# Patient Record
Sex: MALE | Race: WHITE | NOT HISPANIC OR LATINO | Employment: OTHER | URBAN - METROPOLITAN AREA
[De-identification: names, ages, dates, MRNs, and addresses within clinical notes are randomized per-mention and may not be internally consistent; named-entity substitution may affect disease eponyms.]

---

## 2024-09-09 ENCOUNTER — TRANSFERRED RECORDS (OUTPATIENT)
Dept: HEALTH INFORMATION MANAGEMENT | Facility: CLINIC | Age: 69
End: 2024-09-09

## 2024-09-09 ENCOUNTER — MEDICAL CORRESPONDENCE (OUTPATIENT)
Dept: HEALTH INFORMATION MANAGEMENT | Facility: CLINIC | Age: 69
End: 2024-09-09

## 2024-09-23 ENCOUNTER — TRANSFERRED RECORDS (OUTPATIENT)
Dept: HEALTH INFORMATION MANAGEMENT | Facility: CLINIC | Age: 69
End: 2024-09-23
Payer: MEDICARE

## 2024-09-23 LAB
ALT SERPL-CCNC: 42 U/L
AST SERPL-CCNC: 35 U/L (ref 17–59)
CREATININE (EXTERNAL): 0.8 MG/DL (ref 0.7–1.4)
GLUCOSE (EXTERNAL): 114 MG/DL (ref 60–99)
INR (EXTERNAL): 1.02 (ref 0.9–1.11)
POTASSIUM (EXTERNAL): 4.7 MMOL/L (ref 3.5–5.1)

## 2024-09-30 ENCOUNTER — OFFICE VISIT (OUTPATIENT)
Dept: CARDIOLOGY | Facility: CLINIC | Age: 69
End: 2024-09-30
Payer: MEDICARE

## 2024-09-30 VITALS
DIASTOLIC BLOOD PRESSURE: 68 MMHG | HEIGHT: 68 IN | SYSTOLIC BLOOD PRESSURE: 126 MMHG | HEART RATE: 80 BPM | RESPIRATION RATE: 16 BRPM | BODY MASS INDEX: 31.28 KG/M2 | WEIGHT: 206.4 LBS

## 2024-09-30 DIAGNOSIS — E78.2 MIXED HYPERLIPIDEMIA: ICD-10-CM

## 2024-09-30 DIAGNOSIS — I10 BENIGN ESSENTIAL HYPERTENSION: ICD-10-CM

## 2024-09-30 DIAGNOSIS — I51.9 LEFT VENTRICULAR DYSFUNCTION: Primary | ICD-10-CM

## 2024-09-30 DIAGNOSIS — I25.10 CORONARY ARTERY DISEASE INVOLVING NATIVE CORONARY ARTERY OF NATIVE HEART WITHOUT ANGINA PECTORIS: ICD-10-CM

## 2024-09-30 PROCEDURE — 99204 OFFICE O/P NEW MOD 45 MIN: CPT | Performed by: INTERNAL MEDICINE

## 2024-09-30 PROCEDURE — G2211 COMPLEX E/M VISIT ADD ON: HCPCS | Performed by: INTERNAL MEDICINE

## 2024-09-30 RX ORDER — ROSUVASTATIN CALCIUM 20 MG/1
20 TABLET, COATED ORAL DAILY
COMMUNITY

## 2024-09-30 RX ORDER — IBUPROFEN 200 MG
200 TABLET ORAL EVERY 4 HOURS PRN
COMMUNITY

## 2024-09-30 RX ORDER — AMLODIPINE AND BENAZEPRIL HYDROCHLORIDE 5; 10 MG/1; MG/1
1 CAPSULE ORAL DAILY
COMMUNITY

## 2024-09-30 RX ORDER — TIZANIDINE HYDROCHLORIDE 4 MG/1
8 CAPSULE, GELATIN COATED ORAL PRN
COMMUNITY

## 2024-09-30 NOTE — PATIENT INSTRUCTIONS
It was a pleasure to meet with you today.      Below is a summary of your visit.   Continue your medications without changes.  Someone will call you to schedule an echocardiogram to assess your heart function.   You should be able to have your ankle surgery as planned.  Follow up with me in June, 2025.    We will call you to inform you of your test or procedure results within 3 business days of the test being performed.  If you do not hear from our office with the test results within 1 week please do not hesitate to call asking for these results.     Please do not hesitate to call the [facility] with any questions or concerns at [phone]. You can also reach my nurse, Sarahi, at [phone].    Sincerely,

## 2024-09-30 NOTE — PROGRESS NOTES
[facility]   [address]  [place] [address]   [email or web address]   OFFICE: [phone]     CARDIOLOGY CLINIC NOTE     Thank you, Siddhartha Diaz, for asking the [facility] Heart Care team to see Mr. Shelton Eaton to evaluate Consult (Consult for high coronary calcium score )         Assessment/Recommendations   Assessment:    Coronary artery disease - with a markedly elevated calcium score and a recent low-risk stress test. No current angina.  Hypertension - recently started on antihypertensive medication. BP okay here, but had a hypertensive response to exercise. As he has only recently started his medication I won't adjust his medications today, but would consider increasing his Lotrel for better BP control.  Mixed hyperlipidemia - on appropriate statin dose.  LV dysfunction with an LVEF of 48% by NM stress. Will want to confirm the LVEF by echo.    Plan:  Continue medications without changes  Consider increasing Lotrel  Consider adding aspirin 81 mg   Echocardiogram to assess LV function, given report of reduced LVEF by NM stress.  Okay to proceed with ankle surgery as planned.  Follow up in June, 2025         History of Present Illness   Mr. Shelton Eaton is a 69 year old male with a significant past history of HTN, HLD who presents for evaluation of an elevated cardiac calcium score.     recently had CAD screening with a calcium score that was markedly elevated. He then had an exercise NM stress test that was negative for ischemia with good exercise capacity. He did have a hypertensive response to exercise. He generally feels well and denies anginal symptoms.      Other than noted above, Mr. Eaton denies any chest pain/pressure/tightness, shortness of breath at rest or with exertion, light headedness/dizziness, pre-syncope, syncope, lower extremity swelling, palpitations, paroxysmal nocturnal dyspnea (PND), or orthopnea.     Cardiac  Problems and Cardiac Diagnostics     Most Recent Cardiac testing:  ECG dated 9/23/24 (personaly reviewed and interpreted): normal sinus rhythm    Exercise NM stress 9/30/2024  Summary    1. The patient exercised for  8 minute(s) on the  Adi protocol and achieved 95 % of maximum predicted HR.  Study was diagnostic.  Excellent functional capacity for age.     2. The patient experienced no symptoms during the stress test.     3. Hypertensive response to stress.     4. Mildly reduced LV function, EF 48%.     5. Negative stress ECG for ST segment depression.     6. Normal perfusion imaging.  There is no evidence for ischemia or infarction identified on this study.     Prior Study Comparison     No prior study for comparison.     CT cardiac calcium score 9/9/24  Total: 1181.6  LM: 7.6  RCA: 168.0  LAD: 896.7  LCX: 109.3    Stress test: dated 8/17/22 revealed   1. Based on Lemus Score of 3 the patient is at Moderate risk for cardiovascular events.     2. The patient exercised for 8 minutes and 55 seconds on the  Adi protocol.    3. Some PVCs Noted during stress.     4. Hypertensive response to stress.     5. Excellent functional capacity for age.     6. The patient experienced no symptoms during the stress test.     7. Negative stress ECG for diagnostic ST segment depression.     8. Normal Stress Echo test, no evidence for ischemia.          Medications  Allergies   Current Outpatient Medications   Medication Sig Dispense Refill    amLODIPine-benazepril (LOTREL) 5-10 MG capsule Take 1 capsule by mouth daily.      ibuprofen (ADVIL/MOTRIN) 200 MG tablet Take 200 mg by mouth every 4 hours as needed for pain.      rosuvastatin (CRESTOR) 20 MG tablet Take 20 mg by mouth daily.      tiZANidine (ZANAFLEX) 4 MG capsule Take 8 mg by mouth as needed for muscle spasms.        No Known Allergies     Physical Examination Review of Systems   Vitals: /68 (BP Location: Left arm, Patient Position: Sitting, Cuff Size: Adult  "Regular)   Pulse 80   Resp 16   Ht 1.727 m (5' 8\")   Wt 93.6 kg (206 lb 6.4 oz)   BMI 31.38 kg/m    BMI= Body mass index is 31.38 kg/m .  Wt Readings from Last 3 Encounters:   24 93.6 kg (206 lb 6.4 oz)       General: pleasant male. No acute distress.   Neck: No JVD  Lungs: clear to auscultation  COR: normal rate, regular rhythm, No murmurs, rubs, or gallops  Extrem: No edema        Please refer above for cardiac ROS details.       Past History     Past Medical History  No past medical history on file.    Past Surgical History  Past Surgical History:   Procedure Laterality Date    IR CHEST PORT PLACEMENT > 5 YRS OF AGE  10/10/2016       Family History: No family history on file.     Social History:   Social History     Socioeconomic History    Marital status:      Spouse name: Not on file    Number of children: Not on file    Years of education: Not on file    Highest education level: Not on file   Occupational History    Not on file   Tobacco Use    Smoking status: Former     Current packs/day: 0.00     Types: Cigarettes     Quit date:      Years since quittin.7    Smokeless tobacco: Never   Vaping Use    Vaping status: Never Used   Substance and Sexual Activity    Alcohol use: Not on file    Drug use: Never    Sexual activity: Not on file   Other Topics Concern    Not on file   Social History Narrative    Not on file     Social Determinants of Health     Financial Resource Strain: Not on file   Food Insecurity: Not on file   Transportation Needs: Not on file   Physical Activity: Not on file   Stress: Not on file   Social Connections: Not on file   Interpersonal Safety: Not on file   Housing Stability: Not on file            Lab Results    Chemistry/lipid CBC Cardiac Enzymes/BNP/TSH/INR   No results found for: \"CHOL\", \"HDL\", \"TRIG\", \"CHOLHDL\", \"CREATININE\", \"BUN\", \"NA\", \"CO2\" No results found for: \"WBC\", \"HGB\", \"HCT\", \"MCV\", \"PLT\" No results found for: \"CKTOTAL\", \"CKMB\", \"TROPONINI\", " "\"BNP\", \"TSH\", \"INR\"         "

## 2024-09-30 NOTE — LETTER
9/30/2024    Siddhartha Roth MD  [facility] [address] [place] [id number]    RE: Shelton Eaton       Dear Colleague,     I had the pleasure of seeing Shelton Eaton in the [facility].    [facility] HE[facility]ARE   [address]  [place] [address]   [email or web address]   OFFICE: [phone]     CARDIOLOGY CLINIC NOTE     Thank you, Dr. Roth, Siddhartha WRIGHT, for asking the [facility] Heart Care team to see Mr. Shelton Eaton to evaluate Consult (Consult for high coronary calcium score )         Assessment/Recommendations   Assessment:    Coronary artery disease - with a markedly elevated calcium score and a recent low-risk stress test. No current angina.  Hypertension - recently started on antihypertensive medication. BP okay here, but had a hypertensive response to exercise. As he has only recently started his medication I won't adjust his medications today, but would consider increasing his Lotrel for better BP control.  Mixed hyperlipidemia - on appropriate statin dose.  LV dysfunction with an LVEF of 48% by NM stress. Will want to confirm the LVEF by echo.    Plan:  Continue medications without changes  Consider increasing Lotrel  Consider adding aspirin 81 mg   Echocardiogram to assess LV function, given report of reduced LVEF by NM stress.  Okay to proceed with ankle surgery as planned.  Follow up in June, 2025         History of Present Illness   Mr. Shelton Eaton is a 69 year old male with a significant past history of HTN, HLD who presents for evaluation of an elevated cardiac calcium score.     recently had CAD screening with a calcium score that was markedly elevated. He then had an exercise NM stress test that was negative for ischemia with good exercise capacity. He did have a hypertensive response to exercise. He generally feels well and denies anginal symptoms.      Other than noted above, Mr. Eaton denies any chest  pain/pressure/tightness, shortness of breath at rest or with exertion, light headedness/dizziness, pre-syncope, syncope, lower extremity swelling, palpitations, paroxysmal nocturnal dyspnea (PND), or orthopnea.     Cardiac Problems and Cardiac Diagnostics     Most Recent Cardiac testing:  ECG dated 9/23/24 (personaly reviewed and interpreted): normal sinus rhythm    Exercise NM stress 9/30/2024  Summary    1. The patient exercised for  8 minute(s) on the  Adi protocol and achieved 95 % of maximum predicted HR.  Study was diagnostic.  Excellent functional capacity for age.     2. The patient experienced no symptoms during the stress test.     3. Hypertensive response to stress.     4. Mildly reduced LV function, EF 48%.     5. Negative stress ECG for ST segment depression.     6. Normal perfusion imaging.  There is no evidence for ischemia or infarction identified on this study.     Prior Study Comparison     No prior study for comparison.     CT cardiac calcium score 9/9/24  Total: 1181.6  LM: 7.6  RCA: 168.0  LAD: 896.7  LCX: 109.3    Stress test: dated 8/17/22 revealed   1. Based on Lemus Score of 3 the patient is at Moderate risk for cardiovascular events.     2. The patient exercised for 8 minutes and 55 seconds on the  Adi protocol.    3. Some PVCs Noted during stress.     4. Hypertensive response to stress.     5. Excellent functional capacity for age.     6. The patient experienced no symptoms during the stress test.     7. Negative stress ECG for diagnostic ST segment depression.     8. Normal Stress Echo test, no evidence for ischemia.          Medications  Allergies   Current Outpatient Medications   Medication Sig Dispense Refill     amLODIPine-benazepril (LOTREL) 5-10 MG capsule Take 1 capsule by mouth daily.       ibuprofen (ADVIL/MOTRIN) 200 MG tablet Take 200 mg by mouth every 4 hours as needed for pain.       rosuvastatin (CRESTOR) 20 MG tablet Take 20 mg by mouth daily.       tiZANidine  "(ZANAFLEX) 4 MG capsule Take 8 mg by mouth as needed for muscle spasms.        No Known Allergies     Physical Examination Review of Systems   Vitals: /68 (BP Location: Left arm, Patient Position: Sitting, Cuff Size: Adult Regular)   Pulse 80   Resp 16   Ht 1.727 m (5' 8\")   Wt 93.6 kg (206 lb 6.4 oz)   BMI 31.38 kg/m    BMI= Body mass index is 31.38 kg/m .  Wt Readings from Last 3 Encounters:   24 93.6 kg (206 lb 6.4 oz)       General: pleasant male. No acute distress.   Neck: No JVD  Lungs: clear to auscultation  COR: normal rate, regular rhythm, No murmurs, rubs, or gallops  Extrem: No edema        Please refer above for cardiac ROS details.       Past History     Past Medical History  No past medical history on file.    Past Surgical History  Past Surgical History:   Procedure Laterality Date     IR CHEST PORT PLACEMENT > 5 YRS OF AGE  10/10/2016       Family History: No family history on file.     Social History:   Social History     Socioeconomic History     Marital status:      Spouse name: Not on file     Number of children: Not on file     Years of education: Not on file     Highest education level: Not on file   Occupational History     Not on file   Tobacco Use     Smoking status: Former     Current packs/day: 0.00     Types: Cigarettes     Quit date:      Years since quittin.     Smokeless tobacco: Never   Vaping Use     Vaping status: Never Used   Substance and Sexual Activity     Alcohol use: Not on file     Drug use: Never     Sexual activity: Not on file   Other Topics Concern     Not on file   Social History Narrative     Not on file     Social Determinants of Health     Financial Resource Strain: Not on file   Food Insecurity: Not on file   Transportation Needs: Not on file   Physical Activity: Not on file   Stress: Not on file   Social Connections: Not on file   Interpersonal Safety: Not on file   Housing Stability: Not on file            Lab Results  " "  Chemistry/lipid CBC Cardiac Enzymes/BNP/TSH/INR   No results found for: \"CHOL\", \"HDL\", \"TRIG\", \"CHOLHDL\", \"CREATININE\", \"BUN\", \"NA\", \"CO2\" No results found for: \"WBC\", \"HGB\", \"HCT\", \"MCV\", \"PLT\" No results found for: \"CKTOTAL\", \"CKMB\", \"TROPONINI\", \"BNP\", \"TSH\", \"INR\"             Thank you for allowing me to participate in the care of your patient.      Sincerely,     Shawn Painter MD     [facility]  cc:   Siddhartha Roth MD  [facility]  [address] [place], [place] [id number]      "

## 2024-12-15 ENCOUNTER — HEALTH MAINTENANCE LETTER (OUTPATIENT)
Age: 69
End: 2024-12-15

## 2025-05-28 ENCOUNTER — TRANSFERRED RECORDS (OUTPATIENT)
Dept: HEALTH INFORMATION MANAGEMENT | Facility: CLINIC | Age: 70
End: 2025-05-28

## 2025-05-28 LAB
ALT SERPL-CCNC: 40 U/L
AST SERPL-CCNC: 36 U/L (ref 17–59)
CHOLESTEROL (EXTERNAL): 128 MG/DL (ref 0–200)
CREATININE (EXTERNAL): 0.8 MG/DL (ref 0.7–1.4)
GLUCOSE (EXTERNAL): 109 MG/DL (ref 60–99)
HDLC SERPL-MCNC: 63 MG/DL (ref 35–65)
LDL CHOLESTEROL CALCULATED (EXTERNAL): 53 MG/DL (ref 0–130)
POTASSIUM (EXTERNAL): 4.3 MMOL/L (ref 3.5–5.1)
TRIGLYCERIDES (EXTERNAL): 61 MG/DL (ref 0–200)

## 2025-07-03 ENCOUNTER — OFFICE VISIT (OUTPATIENT)
Dept: CARDIOLOGY | Facility: CLINIC | Age: 70
End: 2025-07-03
Attending: INTERNAL MEDICINE
Payer: MEDICARE

## 2025-07-03 VITALS
DIASTOLIC BLOOD PRESSURE: 73 MMHG | SYSTOLIC BLOOD PRESSURE: 120 MMHG | RESPIRATION RATE: 16 BRPM | BODY MASS INDEX: 29.8 KG/M2 | HEART RATE: 72 BPM | WEIGHT: 196 LBS

## 2025-07-03 DIAGNOSIS — I10 BENIGN ESSENTIAL HYPERTENSION: ICD-10-CM

## 2025-07-03 DIAGNOSIS — E78.2 MIXED HYPERLIPIDEMIA: ICD-10-CM

## 2025-07-03 DIAGNOSIS — Z87.891 PERSONAL HISTORY OF TOBACCO USE, PRESENTING HAZARDS TO HEALTH: ICD-10-CM

## 2025-07-03 DIAGNOSIS — I25.10 CORONARY ARTERY DISEASE INVOLVING NATIVE CORONARY ARTERY OF NATIVE HEART WITHOUT ANGINA PECTORIS: Primary | ICD-10-CM

## 2025-07-03 DIAGNOSIS — I70.0 CALCIFICATION OF AORTA: ICD-10-CM

## 2025-07-03 DIAGNOSIS — I51.9 LEFT VENTRICULAR DYSFUNCTION: ICD-10-CM

## 2025-07-03 RX ORDER — LISINOPRIL 10 MG/1
12.5 TABLET ORAL DAILY
COMMUNITY

## 2025-07-03 NOTE — PATIENT INSTRUCTIONS
"It was a pleasure to meet with you today.      Below is a summary of your visit.   Continue your medications without changes.  I encourage regular exercise; more is better.   Your coronary artery disease is stable, and no further routine cardiology follow-up is needed at this time. Your primary care provider (PCP) will continue to monitor your heart health and risk factors, including cholesterol, blood pressure, and lifestyle modifications. If you develop new symptoms, such as chest discomfort or worsening shortness of breath your PCP can always reach out to our cardiology team for assistance and arrange a follow-up if needed.  Consider arranging for another stress test in 3-5 years through Dr. Roth.    Consider reading or listening to the book, \"Outlive: the Science and Art of Longevity\" by Dr. Jimi Harding.       Please do not hesitate to call the [facility] with any questions or concerns at [phone]. You can also reach my nurse, Sarahi, at [phone].    Sincerely,       "

## 2025-07-03 NOTE — PROGRESS NOTES
[facility] HEART CARE   [address]  [place] [address]   [email or web address]   OFFICE: [phone]     CARDIOLOGY CLINIC NOTE     Thank you, Siddhartha Diaz, for asking the [facility] Heart Care team to see Mr. Shelton Eaton to evaluate No chief complaint on file.         Assessment/Recommendations   Assessment:    Coronary artery disease - with a markedly elevated calcium score and a recent low-risk stress test. No current angina.   Hypertension - well-controlled with LDL of 53 as of 5/28/25  Mixed hyperlipidemia - on appropriate statin dose.  Aortic calcifications - on recent chest x-ray. Aorta has previously been screened by CT imaging and no aneurysm was identified.    Reason for Graduation: Stable Coronary Artery Disease  The patient has demonstrated stable coronary artery disease with well-controlled symptoms, risk factors, and medical management. They no longer require ongoing cardiology follow-up and will transition to primary care for continued cardiovascular risk factor management and routine monitoring.    Current Status & Assessment  Current Cardiac Medications: Stable regimen  Lipid Panel:  Recent Labs   Lab Test 05/28/25  1114   TRIG 61      Symptoms: No recent angina, dyspnea, syncope, or arrhythmias  Graduation Criteria Met  ? Stable coronary artery disease  without recurrent symptoms for >= 12 months  ? No recent ACS, revascularization, or high-risk ischemic changes in past 2 years    Next Steps  Follow-up Plan:  1. Routine cardiovascular risk factor management to continue with Primary Care Provider (PCP): Recommend stress testing every 3-5 years or with symptoms suspicious for angina.  2. Reassessment in 6-12 months with PCP unless new concerns arise  3.  Continuation of antiplatelet, statin, and antihypertensive therapy   4.  Encourage lifestyle modifications (diet, exercise, smoking cessation)    When to Refer Back to Cardiology:  1. New or  worsening anginal symptoms  2. New ischemic findings on cardiac testing (ECG, stress test, imaging)    Final Impression:  The patient is clinically stable with well-managed coronary artery disease and no current need for cardiology follow-up. The patient has been informed of their graduation plan and will continue care with their PCP.    Order Placed: Graduation Referral to Primary Care             History of Present Illness   Mr. Shelton Eaton is a 70 year old male with a significant past history of HTN, HLD who presents for follow-up.    Mr. Eaton has known coronary artery disease based on a high CT coronary calcium score of >1000. Exercise stress testing in 9/2024 was reassuring.     Shelton is feeling well today.  He remains active with golfing, walking, bike riding, and yard work.  He denies any exertional cardiorespiratory symptoms or limitations.  He is tolerating his medications which have been stable.    Other than noted above, Mr. Eaton denies any chest pain/pressure/tightness, shortness of breath at rest or with exertion, light headedness/dizziness, pre-syncope, syncope, lower extremity swelling, palpitations, paroxysmal nocturnal dyspnea (PND), or orthopnea.     Cardiac Problems and Cardiac Diagnostics     Most Recent Cardiac testing:  ECG dated 9/23/24 (personaly reviewed and interpreted): normal sinus rhythm    TTE 10/9/24  The left ventricle is normal in size.  There is borderline concentric left ventricular hypertrophy.  The visual ejection fraction is 50-55%.  No regional wall motion abnormalities noted.  Normal right ventricle size and systolic function.  No valvular disease.     No previous study for comparison.    Exercise NM stress 9/30/2024  Summary    1. The patient exercised for  8 minute(s) on the  Adi protocol and achieved 95 % of maximum predicted HR.  Study was diagnostic.  Excellent functional capacity for age.     2. The patient experienced no symptoms during the stress test.     3.  Hypertensive response to stress.     4. Mildly reduced LV function, EF 48%.     5. Negative stress ECG for ST segment depression.     6. Normal perfusion imaging.  There is no evidence for ischemia or infarction identified on this study.     Prior Study Comparison     No prior study for comparison.     CT cardiac calcium score 9/9/24  Total: 1181.6  LM: 7.6  RCA: 168.0  LAD: 896.7  LCX: 109.3    Stress test: dated 8/17/22 revealed   1. Based on Lemus Score of 3 the patient is at Moderate risk for cardiovascular events.     2. The patient exercised for 8 minutes and 55 seconds on the  Adi protocol.    3. Some PVCs Noted during stress.     4. Hypertensive response to stress.     5. Excellent functional capacity for age.     6. The patient experienced no symptoms during the stress test.     7. Negative stress ECG for diagnostic ST segment depression.     8. Normal Stress Echo test, no evidence for ischemia.          Medications  Allergies   Current Outpatient Medications   Medication Sig Dispense Refill    amLODIPine-benazepril (LOTREL) 5-10 MG capsule Take 1 capsule by mouth daily.      ibuprofen (ADVIL/MOTRIN) 200 MG tablet Take 200 mg by mouth every 4 hours as needed for pain.      rosuvastatin (CRESTOR) 20 MG tablet Take 20 mg by mouth daily.      tiZANidine (ZANAFLEX) 4 MG capsule Take 8 mg by mouth as needed for muscle spasms.        No Known Allergies     Physical Examination Review of Systems   Vitals: There were no vitals taken for this visit.  BMI= There is no height or weight on file to calculate BMI.  Wt Readings from Last 3 Encounters:   09/30/24 93.6 kg (206 lb 6.4 oz)       General: pleasant male. No acute distress.   Neck: No JVD  Lungs: clear to auscultation  COR: normal rate, regular rhythm, No murmurs, rubs, or gallops  Extrem: No edema        Please refer above for cardiac ROS details.       Past History     Past Medical History  No past medical history on file.    Past Surgical History  Past  "Surgical History:   Procedure Laterality Date    IR CHEST PORT PLACEMENT > 5 YRS OF AGE  10/10/2016       Family History: No family history on file.     Social History:   Social History     Socioeconomic History    Marital status:      Spouse name: Not on file    Number of children: Not on file    Years of education: Not on file    Highest education level: Not on file   Occupational History    Not on file   Tobacco Use    Smoking status: Former     Current packs/day: 0.00     Types: Cigarettes     Quit date:      Years since quittin.5    Smokeless tobacco: Never   Vaping Use    Vaping status: Never Used   Substance and Sexual Activity    Alcohol use: Not on file    Drug use: Never    Sexual activity: Not on file   Other Topics Concern    Not on file   Social History Narrative    Not on file     Social Drivers of Health     Financial Resource Strain: Not on file   Food Insecurity: Not on file   Transportation Needs: Not on file   Physical Activity: Not on file   Stress: Not on file   Social Connections: Not on file   Interpersonal Safety: Not on file   Housing Stability: Not on file            Lab Results    Chemistry/lipid CBC Cardiac Enzymes/BNP/TSH/INR   Lab Results   Component Value Date    TRIG 61 2025 at [facility]  Total chol: 128  HDL: 63  LDL: 53  Trigs: 61   No results found for: \"WBC\", \"HGB\", \"HCT\", \"MCV\", \"PLT\"         Lab Results   Component Value Date    INR 1.02 2024            "

## 2025-07-03 NOTE — LETTER
7/3/2025    Siddhartha Roth MD  [facility] [address] [place] [id number]    RE: Shelton BRITT Uma       Dear Colleague,     I had the pleasure of seeing Shelton Eaton in the [facility].    [facility]   [address]  [place] [address]   [email or web address]   OFFICE: [phone]     CARDIOLOGY CLINIC NOTE     Thank you, Dr. Roth, Siddhartha WRIGHT, for asking the [facility] team to see . Shelton Eaton to evaluate No chief complaint on file.         Assessment/Recommendations   Assessment:    Coronary artery disease - with a markedly elevated calcium score and a recent low-risk stress test. No current angina.   Hypertension - well-controlled with LDL of 53 as of 5/28/25  Mixed hyperlipidemia - on appropriate statin dose.  Aortic calcifications - on recent chest x-ray. Aorta has previously been screened by CT imaging and no aneurysm was identified.    Reason for Graduation: Stable Coronary Artery Disease  The patient has demonstrated stable coronary artery disease with well-controlled symptoms, risk factors, and medical management. They no longer require ongoing cardiology follow-up and will transition to primary care for continued cardiovascular risk factor management and routine monitoring.    Current Status & Assessment  Current Cardiac Medications: Stable regimen  Lipid Panel:  Recent Labs   Lab Test 05/28/25  1114   TRIG 61      Symptoms: No recent angina, dyspnea, syncope, or arrhythmias  Graduation Criteria Met  ? Stable coronary artery disease  without recurrent symptoms for >= 12 months  ? No recent ACS, revascularization, or high-risk ischemic changes in past 2 years    Next Steps  Follow-up Plan:  1. Routine cardiovascular risk factor management to continue with Primary Care Provider (PCP): Recommend stress testing every 3-5 years or with symptoms suspicious for angina.  2. Reassessment in 6-12 months with PCP unless  new concerns arise  3.  Continuation of antiplatelet, statin, and antihypertensive therapy   4.  Encourage lifestyle modifications (diet, exercise, smoking cessation)    When to Refer Back to Cardiology:  1. New or worsening anginal symptoms  2. New ischemic findings on cardiac testing (ECG, stress test, imaging)    Final Impression:  The patient is clinically stable with well-managed coronary artery disease and no current need for cardiology follow-up. The patient has been informed of their graduation plan and will continue care with their PCP.    Order Placed: Graduation Referral to Primary Care             History of Present Illness   Mr. Shelton Eaton is a 70 year old male with a significant past history of HTN, HLD who presents for follow-up.    Mr. Eaton has known coronary artery disease based on a high CT coronary calcium score of >1000. Exercise stress testing in 9/2024 was reassuring.     Shelton is feeling well today.  He remains active with golfing, walking, bike riding, and yard work.  He denies any exertional cardiorespiratory symptoms or limitations.  He is tolerating his medications which have been stable.    Other than noted above, Mr. Eaton denies any chest pain/pressure/tightness, shortness of breath at rest or with exertion, light headedness/dizziness, pre-syncope, syncope, lower extremity swelling, palpitations, paroxysmal nocturnal dyspnea (PND), or orthopnea.     Cardiac Problems and Cardiac Diagnostics     Most Recent Cardiac testing:  ECG dated 9/23/24 (personaly reviewed and interpreted): normal sinus rhythm    TTE 10/9/24  The left ventricle is normal in size.  There is borderline concentric left ventricular hypertrophy.  The visual ejection fraction is 50-55%.  No regional wall motion abnormalities noted.  Normal right ventricle size and systolic function.  No valvular disease.     No previous study for comparison.    Exercise NM stress 9/30/2024  Summary    1. The patient exercised for  8  minute(s) on the  Adi protocol and achieved 95 % of maximum predicted HR.  Study was diagnostic.  Excellent functional capacity for age.     2. The patient experienced no symptoms during the stress test.     3. Hypertensive response to stress.     4. Mildly reduced LV function, EF 48%.     5. Negative stress ECG for ST segment depression.     6. Normal perfusion imaging.  There is no evidence for ischemia or infarction identified on this study.     Prior Study Comparison     No prior study for comparison.     CT cardiac calcium score 9/9/24  Total: 1181.6  LM: 7.6  RCA: 168.0  LAD: 896.7  LCX: 109.3    Stress test: dated 8/17/22 revealed   1. Based on Lemus Score of 3 the patient is at Moderate risk for cardiovascular events.     2. The patient exercised for 8 minutes and 55 seconds on the  Adi protocol.    3. Some PVCs Noted during stress.     4. Hypertensive response to stress.     5. Excellent functional capacity for age.     6. The patient experienced no symptoms during the stress test.     7. Negative stress ECG for diagnostic ST segment depression.     8. Normal Stress Echo test, no evidence for ischemia.          Medications  Allergies   Current Outpatient Medications   Medication Sig Dispense Refill     amLODIPine-benazepril (LOTREL) 5-10 MG capsule Take 1 capsule by mouth daily.       ibuprofen (ADVIL/MOTRIN) 200 MG tablet Take 200 mg by mouth every 4 hours as needed for pain.       rosuvastatin (CRESTOR) 20 MG tablet Take 20 mg by mouth daily.       tiZANidine (ZANAFLEX) 4 MG capsule Take 8 mg by mouth as needed for muscle spasms.        No Known Allergies     Physical Examination Review of Systems   Vitals: There were no vitals taken for this visit.  BMI= There is no height or weight on file to calculate BMI.  Wt Readings from Last 3 Encounters:   09/30/24 93.6 kg (206 lb 6.4 oz)       General: pleasant male. No acute distress.   Neck: No JVD  Lungs: clear to auscultation  COR: normal rate, regular  "rhythm, No murmurs, rubs, or gallops  Extrem: No edema        Please refer above for cardiac ROS details.       Past History     Past Medical History  No past medical history on file.    Past Surgical History  Past Surgical History:   Procedure Laterality Date     IR CHEST PORT PLACEMENT > 5 YRS OF AGE  10/10/2016       Family History: No family history on file.     Social History:   Social History     Socioeconomic History     Marital status:      Spouse name: Not on file     Number of children: Not on file     Years of education: Not on file     Highest education level: Not on file   Occupational History     Not on file   Tobacco Use     Smoking status: Former     Current packs/day: 0.00     Types: Cigarettes     Quit date:      Years since quittin.5     Smokeless tobacco: Never   Vaping Use     Vaping status: Never Used   Substance and Sexual Activity     Alcohol use: Not on file     Drug use: Never     Sexual activity: Not on file   Other Topics Concern     Not on file   Social History Narrative     Not on file     Social Drivers of Health     Financial Resource Strain: Not on file   Food Insecurity: Not on file   Transportation Needs: Not on file   Physical Activity: Not on file   Stress: Not on file   Social Connections: Not on file   Interpersonal Safety: Not on file   Housing Stability: Not on file            Lab Results    Chemistry/lipid CBC Cardiac Enzymes/BNP/TSH/INR   Lab Results   Component Value Date    TRIG 61 2025 at [facility]  Total chol: 128  HDL: 63  LDL: 53  Trigs: 61   No results found for: \"WBC\", \"HGB\", \"HCT\", \"MCV\", \"PLT\"         Lab Results   Component Value Date    INR 1.02 2024              Thank you for allowing me to participate in the care of your patient.      Sincerely,     Shawn Painter MD     [facility]  cc:   Shawn Painter MD  [address], [address]  [place] " [address]